# Patient Record
Sex: FEMALE | Race: WHITE | NOT HISPANIC OR LATINO | Employment: FULL TIME | URBAN - METROPOLITAN AREA
[De-identification: names, ages, dates, MRNs, and addresses within clinical notes are randomized per-mention and may not be internally consistent; named-entity substitution may affect disease eponyms.]

---

## 2023-03-27 PROBLEM — Z86.32 HISTORY OF GESTATIONAL DIABETES: Status: ACTIVE | Noted: 2023-03-27

## 2023-03-27 PROBLEM — E04.9 ENLARGED THYROID: Status: ACTIVE | Noted: 2023-03-27

## 2023-03-27 PROBLEM — N87.9 DYSPLASIA OF CERVIX: Status: ACTIVE | Noted: 2023-03-27

## 2023-03-27 PROBLEM — R63.5 WEIGHT GAIN: Status: ACTIVE | Noted: 2023-03-27

## 2023-03-29 ENCOUNTER — OFFICE VISIT (OUTPATIENT)
Dept: PRIMARY CARE | Facility: CLINIC | Age: 33
End: 2023-03-29
Payer: COMMERCIAL

## 2023-03-29 VITALS
HEIGHT: 65 IN | TEMPERATURE: 97.6 F | WEIGHT: 194.8 LBS | BODY MASS INDEX: 32.46 KG/M2 | HEART RATE: 74 BPM | RESPIRATION RATE: 16 BRPM | DIASTOLIC BLOOD PRESSURE: 76 MMHG | SYSTOLIC BLOOD PRESSURE: 122 MMHG

## 2023-03-29 DIAGNOSIS — M25.571 ACUTE RIGHT ANKLE PAIN: Primary | ICD-10-CM

## 2023-03-29 DIAGNOSIS — Z00.8 ENCOUNTER FOR BIOMETRIC SCREENING: ICD-10-CM

## 2023-03-29 PROCEDURE — 99213 OFFICE O/P EST LOW 20 MIN: CPT | Performed by: NURSE PRACTITIONER

## 2023-03-29 PROCEDURE — 1036F TOBACCO NON-USER: CPT | Performed by: NURSE PRACTITIONER

## 2023-03-29 ASSESSMENT — PATIENT HEALTH QUESTIONNAIRE - PHQ9
2. FEELING DOWN, DEPRESSED OR HOPELESS: NOT AT ALL
SUM OF ALL RESPONSES TO PHQ9 QUESTIONS 1 & 2: 0
1. LITTLE INTEREST OR PLEASURE IN DOING THINGS: NOT AT ALL

## 2023-03-29 NOTE — PATIENT INSTRUCTIONS
Trigs 120  HDL 69      Gluc 96  Non-  Ratio 3.1  Continue heart healthy lifestyle.  Xray ordered, additional followup based on findings.  Elevate, ACE wrap toes up might be helpful also, apply early in day and remove at night.

## 2023-03-29 NOTE — PROGRESS NOTES
"Subjective   Patient ID: Eugenia Monae is a 32 y.o. female who presents for Biometric Health Screening.    Also reports some right ankle pain and swelling about  4 weeks  Mild STS, can be worse end of day  No overt trauma, does not report focal injury.  Some tenderness intermittently but has not resolved         Review of Systems   Musculoskeletal:         Per HPI   Neurological:  Negative for weakness and numbness.       Objective   Pulse 74   Temp 36.4 °C (97.6 °F)   Resp 16   Ht 1.651 m (5' 5\")   Wt 88.4 kg (194 lb 12.8 oz)   BMI 32.42 kg/m²     Physical Exam  Musculoskeletal:         General: Normal range of motion.      Comments: Mild STS evident, min pale ecchymosis without hematoma inferior  to right lateral malleolus.   No bony tenderness in foot, TTP inferior lateral malleolus.  FROM.  NV intact.         Assessment/Plan   Diagnoses and all orders for this visit:  Acute right ankle pain  -     XR ankle right 3+ views; Future         "

## 2023-04-12 PROBLEM — M25.571 ACUTE RIGHT ANKLE PAIN: Status: ACTIVE | Noted: 2023-04-12

## 2023-04-12 PROBLEM — Z00.8 ENCOUNTER FOR BIOMETRIC SCREENING: Status: ACTIVE | Noted: 2023-04-12

## 2023-04-12 ASSESSMENT — ENCOUNTER SYMPTOMS
NUMBNESS: 0
WEAKNESS: 0